# Patient Record
Sex: MALE | Race: WHITE | NOT HISPANIC OR LATINO | URBAN - METROPOLITAN AREA
[De-identification: names, ages, dates, MRNs, and addresses within clinical notes are randomized per-mention and may not be internally consistent; named-entity substitution may affect disease eponyms.]

---

## 2020-04-30 ENCOUNTER — NURSE TRIAGE (OUTPATIENT)
Dept: OTHER | Facility: OTHER | Age: 45
End: 2020-04-30

## 2020-04-30 DIAGNOSIS — Z20.828 EXPOSURE TO SARS-ASSOCIATED CORONAVIRUS: Primary | ICD-10-CM

## 2020-05-01 DIAGNOSIS — Z20.828 EXPOSURE TO SARS-ASSOCIATED CORONAVIRUS: ICD-10-CM

## 2020-05-01 PROCEDURE — U0003 INFECTIOUS AGENT DETECTION BY NUCLEIC ACID (DNA OR RNA); SEVERE ACUTE RESPIRATORY SYNDROME CORONAVIRUS 2 (SARS-COV-2) (CORONAVIRUS DISEASE [COVID-19]), AMPLIFIED PROBE TECHNIQUE, MAKING USE OF HIGH THROUGHPUT TECHNOLOGIES AS DESCRIBED BY CMS-2020-01-R: HCPCS

## 2020-05-02 LAB — SARS-COV-2 RNA SPEC QL NAA+PROBE: NOT DETECTED

## 2020-05-03 ENCOUNTER — TELEPHONE (OUTPATIENT)
Dept: OTHER | Facility: HOSPITAL | Age: 45
End: 2020-05-03

## 2020-05-26 ENCOUNTER — TELEPHONE (OUTPATIENT)
Dept: INTERNAL MEDICINE CLINIC | Facility: CLINIC | Age: 45
End: 2020-05-26

## 2020-09-28 ENCOUNTER — NURSE TRIAGE (OUTPATIENT)
Dept: OTHER | Facility: OTHER | Age: 45
End: 2020-09-28

## 2020-09-28 DIAGNOSIS — Z20.828 EXPOSURE TO SARS-ASSOCIATED CORONAVIRUS: Primary | ICD-10-CM

## 2020-09-29 DIAGNOSIS — Z20.828 EXPOSURE TO SARS-ASSOCIATED CORONAVIRUS: ICD-10-CM

## 2020-09-29 PROCEDURE — U0003 INFECTIOUS AGENT DETECTION BY NUCLEIC ACID (DNA OR RNA); SEVERE ACUTE RESPIRATORY SYNDROME CORONAVIRUS 2 (SARS-COV-2) (CORONAVIRUS DISEASE [COVID-19]), AMPLIFIED PROBE TECHNIQUE, MAKING USE OF HIGH THROUGHPUT TECHNOLOGIES AS DESCRIBED BY CMS-2020-01-R: HCPCS | Performed by: FAMILY MEDICINE

## 2020-09-29 NOTE — TELEPHONE ENCOUNTER
Regarding: covid-asymptomatic  ----- Message from [facility] sent at 9/28/2020  5:48 PM EDT -----  Patient would like to be tested for covid due to being in contact with a positive patient

## 2020-09-30 LAB — SARS-COV-2 RNA SPEC QL NAA+PROBE: NOT DETECTED

## 2020-11-05 NOTE — TELEPHONE ENCOUNTER
Reason for Disposition   [1] COVID-19 EXPOSURE (Close Contact) AND [2] within last 14 days BUT [3] NO symptoms    Answer Assessment - Initial Assessment Questions  1  CLOSE CONTACT: "Who is the person with the confirmed or suspected COVID-19 infection that you were exposed to?"     Co worker  2  PLACE of CONTACT: "Where were you when you were exposed to COVID-19?" (e g , home, school, medical waiting room; which city?)      work  3  TYPE of CONTACT: "How much contact was there?" (e g , sitting next to, live in same house, work in same office, same building)      direct  4  DURATION of CONTACT: "How long were you in contact with the COVID-19 patient?" (e g , a few seconds, passed by person, a few minutes, live with the patient)      8 hours  5  DATE of CONTACT: "When did you have contact with a COVID-19 patient?" (e g , how many days ago)      9/25/20  6  TRAVEL: "Have you traveled out of the country recently?" If so, "When and where?"      * Also ask about out-of-state travel, since the CDC has identified some high-risk cities for community spread in the [address] [place] [address] [address]  * Note: Travel becomes less relevant if there is widespread community transmission where the patient lives  no  7  COMMUNITY SPREAD: "Are there lots of cases of COVID-19 (community spread) where you live?" (See public health department website, if unsure)        yes  8  SYMPTOMS: "Do you have any symptoms?" (e g , fever, cough, breathing difficulty)      no    10  HIGH RISK: "Do you have any heart or lung problems? Do you have a weak immune system?" (e g , CHF, COPD, asthma, HIV positive, chemotherapy, renal failure, diabetes mellitus, sickle cell anemia)        no    Protocols used: CORONAVIRUS (COVID-19) EXPOSURE-ADULT-AH  pt had direct covid exposure and requires a test for work  No symptoms currently   Order placed, care advice given n/a

## 2021-04-08 DIAGNOSIS — Z23 ENCOUNTER FOR IMMUNIZATION: ICD-10-CM

## 2021-04-18 ENCOUNTER — IMMUNIZATIONS (OUTPATIENT)
Dept: FAMILY MEDICINE CLINIC | Facility: HOSPITAL | Age: 46
End: 2021-04-18

## 2021-04-18 DIAGNOSIS — Z23 ENCOUNTER FOR IMMUNIZATION: Primary | ICD-10-CM

## 2021-04-18 PROCEDURE — 0001A SARS-COV-2 / COVID-19 MRNA VACCINE (PFIZER-BIONTECH) 30 MCG: CPT

## 2021-04-18 PROCEDURE — 91300 SARS-COV-2 / COVID-19 MRNA VACCINE (PFIZER-BIONTECH) 30 MCG: CPT

## 2021-05-09 ENCOUNTER — IMMUNIZATIONS (OUTPATIENT)
Dept: FAMILY MEDICINE CLINIC | Facility: HOSPITAL | Age: 46
End: 2021-05-09

## 2021-05-09 DIAGNOSIS — Z23 ENCOUNTER FOR IMMUNIZATION: Primary | ICD-10-CM

## 2021-05-09 PROCEDURE — 0002A SARS-COV-2 / COVID-19 MRNA VACCINE (PFIZER-BIONTECH) 30 MCG: CPT

## 2021-05-09 PROCEDURE — 91300 SARS-COV-2 / COVID-19 MRNA VACCINE (PFIZER-BIONTECH) 30 MCG: CPT

## 2021-10-13 ENCOUNTER — TELEPHONE (OUTPATIENT)
Dept: GASTROENTEROLOGY | Facility: CLINIC | Age: 46
End: 2021-10-13

## 2021-12-31 ENCOUNTER — NURSE TRIAGE (OUTPATIENT)
Dept: OTHER | Facility: OTHER | Age: 46
End: 2021-12-31

## 2021-12-31 DIAGNOSIS — Z20.828 SARS-ASSOCIATED CORONAVIRUS EXPOSURE: Primary | ICD-10-CM

## 2022-01-02 PROCEDURE — 87636 SARSCOV2 & INF A&B AMP PRB: CPT | Performed by: FAMILY MEDICINE

## 2025-02-28 ENCOUNTER — TELEPHONE (OUTPATIENT)
Age: 50
End: 2025-02-28

## 2025-02-28 ENCOUNTER — TELEPHONE (OUTPATIENT)
Dept: GASTROENTEROLOGY | Facility: CLINIC | Age: 50
End: 2025-02-28

## 2025-02-28 ENCOUNTER — OFFICE VISIT (OUTPATIENT)
Dept: GASTROENTEROLOGY | Facility: CLINIC | Age: 50
End: 2025-02-28
Payer: COMMERCIAL

## 2025-02-28 VITALS — WEIGHT: 189 LBS | DIASTOLIC BLOOD PRESSURE: 82 MMHG | SYSTOLIC BLOOD PRESSURE: 136 MMHG | HEART RATE: 69 BPM

## 2025-02-28 DIAGNOSIS — K22.719 BARRETT'S ESOPHAGUS WITH DYSPLASIA: ICD-10-CM

## 2025-02-28 DIAGNOSIS — K21.9 GASTROESOPHAGEAL REFLUX DISEASE, UNSPECIFIED WHETHER ESOPHAGITIS PRESENT: ICD-10-CM

## 2025-02-28 DIAGNOSIS — K51.919 ULCERATIVE COLITIS WITH COMPLICATION, UNSPECIFIED LOCATION (HCC): Primary | ICD-10-CM

## 2025-02-28 PROCEDURE — 99204 OFFICE O/P NEW MOD 45 MIN: CPT | Performed by: NURSE PRACTITIONER

## 2025-02-28 RX ORDER — SODIUM CHLORIDE, SODIUM LACTATE, POTASSIUM CHLORIDE, CALCIUM CHLORIDE 600; 310; 30; 20 MG/100ML; MG/100ML; MG/100ML; MG/100ML
125 INJECTION, SOLUTION INTRAVENOUS CONTINUOUS
OUTPATIENT
Start: 2025-02-28

## 2025-02-28 RX ORDER — PREDNISONE 10 MG/1
10 TABLET ORAL SEE ADMIN INSTRUCTIONS
Qty: 70 TABLET | Refills: 0 | Status: SHIPPED | OUTPATIENT
Start: 2025-02-28

## 2025-02-28 NOTE — TELEPHONE ENCOUNTER
Scheduled date of EGD/colonoscopy (as of today): 5/8/25  Physician performing EGD/colonoscopy: Dr. Ramirez  Location of EGD/colonoscopy:   Desired bowel prep reviewed with patient: [email or web address]  Instructions reviewed with patient by: dante given at appt  Clearances:  n/a

## 2025-02-28 NOTE — PROGRESS NOTES
Name: Jeremie Frances      : 1975      MRN: [id number]  Encounter Provider: KANA Lee  Encounter Date: 2025   Encounter department: [facility] GASTROENTEROLOGY [place] [address]  :  Assessment & Plan  Ulcerative colitis with complication, unspecified location (HCC)  Patient with history of ulcerative colitis reports 6-8 episodes of bloody diarrhea daily x 1 month. Sometimes sees mucus in stool. Initially had feelings of urgency, can now wait about 15-20 minutes before needing to go to the bathroom. Denies fever, chills, unintentional weight loss, mouth sores, rashes, joint pains, fatigue. Reports one instance of waking up overnight to go to the bathroom, otherwise denies nocturnal symptoms. Reports associated gas, sometimes feels like he needs to have a BM but then only passes a small amount of stool and gas. Per chart review his last colonoscopy was in  where 2 small sigmoid polyps were removed, evidence of sigmoid diverticulosis, and mild erythema of the rectum consistent with ulcerative colitis/proctitis with recommendation for follow up colonoscopy in 3 years. Biopsies at the time showed colonic mucosa with focal glandular hyperplasia. No recent lab work, will obtain CMP, CBC, CRP. Will also order stool studies to rule out infectious cause of diarrhea.     Orders:    Colonoscopy; Future    Comprehensive metabolic panel; Future    C-reactive protein; Future    CBC and Platelet; Future    Calprotectin,Fecal; Future    polyethylene glycol (GOLYTELY) 4000 mL solution; Take 4,000 mL by mouth once for 1 dose Follow instructions provided by office prior to procedure    Clostridium difficile toxin by PCR with EIA; Future    Stool Enteric Bacterial Panel by PCR; Future    Giardia lamblia, EIA and Ova and Parasites Examination; Future    predniSONE 10 mg tablet; Take 1 tablet (10 mg total) by mouth see administration instructions Prednisone 40 mg (4 tablets) daily  x 7  days then 30 mg daily ( 3 tablets) x 7 days then 20 mg (2 tablets) daily x 7 days then 10 mg ( 1 tablet) daily x 7 days then discontinue  - Will wait for results of colonoscopy before initiating medication for ulcerative colitis   - Strict avoidance of NSAIDs    Badillo's esophagus with dysplasia  History of Badillo's esophagus. Noted on most recent EGD in 2018. Biopsies negative for dysplasia, regenerative atypia noted. Follow up EGD recommended in 2 years which was never done. He was previously prescribed pantoprazole which patient reports he has not been taking. Will plan for EGD at time of colonoscopy as above for further surveillance.   Orders:    EGD; Future  - Patient uninterested in resuming pantoprazole at this time, wants to wait for results of EGD first    Gastroesophageal reflux disease, unspecified whether esophagitis present  Patient reports intermittent episodes of heartburn/reflux which he reports only occurs when he eats meals late at night. Has not been taking any medication for this.   - EGD as above  - Anti-reflux dietary measures reviewed with patient       Follow up after procedure    History of Present Illness   HPI  eJremie Frances is a 49 y.o. male with PMH including Badillo's esophagus, ulcerative colitis who presents with complaints of bloody diarrhea x 1 month. Initially associated with urgency but has improved to being able to wait 15-20 minutes before needing to go to the bathroom. Sometimes sees mucus in stool. Episodes of diarrhea occur 6-8 times per day and patient reports blood in stool every time he moves his bowels. Denies fever, chills, unintentional weight loss, mouth sores, rashes, joint pains, fatigue. Reports one instance of waking up overnight to go to the bathroom, otherwise denies nocturnal symptoms. Reports associated gas, sometimes feels like he needs to have a BM but then only passes a small amount of stool and gas. He was previously prescribed Pentasa but reports  that he only took this for about one month. He reports family history significant for Crohn's disease in paternal grandmother. He is a previous smoker, quit about 7 years ago. Occasional alcohol use. Patient reports that he has been taking Advil daily for the past week.     Per chart review his last colonoscopy was in 2017 where 2 small sigmoid polyps were removed, evidence of sigmoid diverticulosis, and mild erythema of the rectum consistent with ulcerative colitis/proctitis with recommendation for follow up colonoscopy in 3 years. Biopsies at the time showed colonic mucosa with focal glandular hyperplasia.     Most recent EGD in 2018 showed Badillo's esophagus. Biopsies negative for dysplasia, regenerative atypia noted. Follow up EGD was recommended in 2 years.    History obtained from: patient    Review of Systems   Constitutional:  Negative for chills, fatigue, fever and unexpected weight change.   HENT:  Negative for ear pain and sore throat.    Eyes:  Negative for pain and visual disturbance.   Respiratory:  Negative for cough and shortness of breath.    Cardiovascular:  Negative for chest pain and palpitations.   Gastrointestinal:  Positive for blood in stool and diarrhea. Negative for abdominal pain, nausea and vomiting.   Genitourinary:  Negative for dysuria and hematuria.   Musculoskeletal:  Negative for arthralgias and back pain.   Skin:  Negative for color change and rash.   Neurological:  Negative for seizures and syncope.   All other systems reviewed and are negative.    Medical History Reviewed by provider this encounter:  Tobacco  Allergies  Meds  Problems  Med Hx  Surg Hx  Fam Hx     .  Past Medical History   Past Medical History:   Diagnosis Date    Ulcerative colitis (HCC) 2003     History reviewed. No pertinent surgical history.  Family History   Problem Relation Age of Onset    Other (bladder cancer) Father     Colon cancer Maternal Grandfather     Crohn's disease Paternal Grandmother        reports that he has quit smoking. His smoking use included cigarettes. He has quit using smokeless tobacco. He reports that he does not currently use alcohol.  Current Outpatient Medications   Medication Instructions    polyethylene glycol (GOLYTELY) 4000 mL solution 4,000 mL, Oral, Once, Follow instructions provided by office prior to procedure    predniSONE 10 mg, Oral, See admin instructions, Prednisone 40 mg (4 tablets) daily  x 7 days then 30 mg daily ( 3 tablets) x 7 days then 20 mg (2 tablets) daily x 7 days then 10 mg ( 1 tablet) daily x 7 days then discontinue     Allergies   Allergen Reactions    Nsaids Other (See Comments)     Contraindicated in patients with ulcerative colitis    Penicillins Hives     Reaction Date: [id number];      No current outpatient medications on file prior to visit.     No current facility-administered medications on file prior to visit.      Social History     Tobacco Use    Smoking status: Former     Types: Cigarettes    Smokeless tobacco: Former   Substance and Sexual Activity    Alcohol use: Not Currently    Drug use: Not on file    Sexual activity: Not on file        Objective   /82 (BP Location: Left arm, Patient Position: Sitting, Cuff Size: Adult)   Pulse 69   Wt 85.7 kg (189 lb)      Physical Exam  Vitals and nursing note reviewed.   Constitutional:       General: He is not in acute distress.     Appearance: He is well-developed.   HENT:      Head: Normocephalic and atraumatic.   Eyes:      Conjunctiva/sclera: Conjunctivae normal.   Cardiovascular:      Rate and Rhythm: Normal rate and regular rhythm.      Pulses: Normal pulses.      Heart sounds: Normal heart sounds. No murmur heard.  Pulmonary:      Effort: Pulmonary effort is normal. No respiratory distress.      Breath sounds: Normal breath sounds. No stridor. No wheezing, rhonchi or rales.   Abdominal:      General: Bowel sounds are normal. There is no distension.      Palpations: Abdomen is soft. There  is no mass.      Tenderness: There is no abdominal tenderness. There is no guarding or rebound.   Musculoskeletal:      Cervical back: Neck supple.   Skin:     General: Skin is warm and dry.      Capillary Refill: Capillary refill takes less than 2 seconds.      Coloration: Skin is not jaundiced or pale.   Neurological:      Mental Status: He is alert and oriented to person, place, and time.   Psychiatric:         Mood and Affect: Mood normal.